# Patient Record
Sex: MALE | Race: WHITE | NOT HISPANIC OR LATINO | Employment: FULL TIME | URBAN - METROPOLITAN AREA
[De-identification: names, ages, dates, MRNs, and addresses within clinical notes are randomized per-mention and may not be internally consistent; named-entity substitution may affect disease eponyms.]

---

## 2017-11-17 ENCOUNTER — OFFICE VISIT (OUTPATIENT)
Dept: PHYSICAL THERAPY | Facility: CLINIC | Age: 52
End: 2017-11-17

## 2017-11-17 DIAGNOSIS — Z02.1 PHYSICAL EXAM, PRE-EMPLOYMENT: Primary | ICD-10-CM

## 2017-11-17 PROCEDURE — PDS: Performed by: PHYSICAL THERAPIST

## 2017-11-17 NOTE — PROGRESS NOTES
See paper copy kept either in patients Occ Med chart and/or completed PDS folder for that company.        PT/PTA Signature: Bubba Aden, PTA  Physical Therapist Assistant [place] [address]